# Patient Record
Sex: FEMALE | Race: WHITE
[De-identification: names, ages, dates, MRNs, and addresses within clinical notes are randomized per-mention and may not be internally consistent; named-entity substitution may affect disease eponyms.]

---

## 2019-11-04 PROBLEM — Z00.00 ENCOUNTER FOR PREVENTIVE HEALTH EXAMINATION: Status: ACTIVE | Noted: 2019-11-04

## 2019-11-05 ENCOUNTER — APPOINTMENT (OUTPATIENT)
Dept: GASTROENTEROLOGY | Facility: CLINIC | Age: 29
End: 2019-11-05
Payer: COMMERCIAL

## 2019-11-05 ENCOUNTER — LABORATORY RESULT (OUTPATIENT)
Age: 29
End: 2019-11-05

## 2019-11-05 VITALS
SYSTOLIC BLOOD PRESSURE: 107 MMHG | HEIGHT: 64 IN | BODY MASS INDEX: 20.49 KG/M2 | WEIGHT: 120 LBS | TEMPERATURE: 98.9 F | HEART RATE: 104 BPM | DIASTOLIC BLOOD PRESSURE: 80 MMHG | OXYGEN SATURATION: 98 % | RESPIRATION RATE: 15 BRPM

## 2019-11-05 DIAGNOSIS — K90.0 CELIAC DISEASE: ICD-10-CM

## 2019-11-05 DIAGNOSIS — E61.0 COPPER DEFICIENCY: ICD-10-CM

## 2019-11-05 DIAGNOSIS — R19.7 DIARRHEA, UNSPECIFIED: ICD-10-CM

## 2019-11-05 DIAGNOSIS — R17 UNSPECIFIED JAUNDICE: ICD-10-CM

## 2019-11-05 DIAGNOSIS — K90.49 MALABSORPTION DUE TO INTOLERANCE, NOT ELSEWHERE CLASSIFIED: ICD-10-CM

## 2019-11-05 DIAGNOSIS — R12 HEARTBURN: ICD-10-CM

## 2019-11-05 DIAGNOSIS — R11.2 NAUSEA WITH VOMITING, UNSPECIFIED: ICD-10-CM

## 2019-11-05 DIAGNOSIS — Z87.898 PERSONAL HISTORY OF OTHER SPECIFIED CONDITIONS: ICD-10-CM

## 2019-11-05 DIAGNOSIS — K59.00 CONSTIPATION, UNSPECIFIED: ICD-10-CM

## 2019-11-05 DIAGNOSIS — Z87.19 PERSONAL HISTORY OF OTHER DISEASES OF THE DIGESTIVE SYSTEM: ICD-10-CM

## 2019-11-05 DIAGNOSIS — K63.89 OTHER SPECIFIED DISEASES OF INTESTINE: ICD-10-CM

## 2019-11-05 PROCEDURE — 99204 OFFICE O/P NEW MOD 45 MIN: CPT | Mod: 25

## 2019-11-05 PROCEDURE — 36415 COLL VENOUS BLD VENIPUNCTURE: CPT

## 2019-11-06 PROBLEM — K90.49 FOOD INTOLERANCE: Status: ACTIVE | Noted: 2019-11-05

## 2019-11-06 PROBLEM — R19.7 DIARRHEA: Status: ACTIVE | Noted: 2019-11-05

## 2019-11-06 PROBLEM — Z87.898 HISTORY OF HEARTBURN: Status: RESOLVED | Noted: 2019-11-05 | Resolved: 2019-11-06

## 2019-11-06 PROBLEM — K59.00 CONSTIPATION: Status: ACTIVE | Noted: 2019-11-05

## 2019-11-06 PROBLEM — Z87.19 HISTORY OF GASTROESOPHAGEAL REFLUX (GERD): Status: RESOLVED | Noted: 2019-11-05 | Resolved: 2019-11-06

## 2019-11-06 PROBLEM — R11.2 NAUSEA AND VOMITING: Status: ACTIVE | Noted: 2019-11-05

## 2019-11-06 PROBLEM — K90.0 ADULT CELIAC DISEASE: Status: RESOLVED | Noted: 2019-11-05 | Resolved: 2019-11-06

## 2019-11-06 PROBLEM — R12 HEART BURN: Status: ACTIVE | Noted: 2019-11-05

## 2019-11-06 LAB — CERULOPLASMIN SERPL-MCNC: 16 MG/DL

## 2019-11-06 NOTE — PHYSICAL EXAM
[General Appearance - Alert] : alert [General Appearance - In No Acute Distress] : in no acute distress [General Appearance - Well Nourished] : well nourished [Sclera] : the sclera and conjunctiva were normal [Outer Ear] : the ears and nose were normal in appearance [Oropharynx] : the oropharynx was normal [Neck Appearance] : the appearance of the neck was normal [Neck Cervical Mass (___cm)] : no neck mass was observed [Respiration, Rhythm And Depth] : normal respiratory rhythm and effort [Heart Rate And Rhythm] : heart rate was normal and rhythm regular [Edema] : there was no peripheral edema [Bowel Sounds] : normal bowel sounds [Abdomen Soft] : soft [Abdomen Tenderness] : non-tender [Abnormal Walk] : normal gait [Skin Color & Pigmentation] : normal skin color and pigmentation [Skin Turgor] : normal skin turgor [] : no rash [Oriented To Time, Place, And Person] : oriented to person, place, and time [Impaired Insight] : insight and judgment were intact [Affect] : the affect was normal

## 2019-11-07 LAB
ANA SER IF-ACNC: NEGATIVE
BARLEY IGE QN: <0.1 KUA/L
CHERRY IGE QN: <0.1 KUA/L
COW MILK IGE QN: <0.1 KUA/L
CRAB IGE QN: <0.1 KUA/L
DEPRECATED BARLEY IGE RAST QL: 0
DEPRECATED CHERRY IGE RAST QL: 0
DEPRECATED COW MILK IGE RAST QL: 0
DEPRECATED CRAB IGE RAST QL: 0
DEPRECATED EGG WHITE IGE RAST QL: 0
DEPRECATED OAT IGE RAST QL: 0
DEPRECATED PEANUT IGE RAST QL: 3
DEPRECATED RYE IGE RAST QL: 0
DEPRECATED SOYBEAN IGE RAST QL: 0
DEPRECATED WHEAT IGE RAST QL: 0
EGG WHITE IGE QN: <0.1 KUA/L
OAT IGE QN: <0.1 KUA/L
PEANUT IGE QN: 3.94 KUA/L
RYE IGE QN: <0.1 KUA/L
SOYBEAN IGE QN: <0.1 KUA/L
TOTAL IGE SMQN RAST: 42 KU/L
WHEAT IGE QN: <0.1 KUA/L

## 2019-11-07 NOTE — ASSESSMENT
[FreeTextEntry1] : 29F with PMHx interstitial cystitis, celiac disease, POTS and endometriosis referred by Meeta Reyes for management of SIBO. \par - retest for SIBO since symptoms are reoccurring, will send home breath test to patient  \par - will evaluate low copper level by checking copper level and ceruloplasmin level today\par - TITI level to determine if any autoimmune disease are present \par - food allergy testing, vitamin and mineral levels, fecal calprotectin and CRP, GI PCR, Giardia stool sample, O/P \par - evaluate high bilirubin level with fractionated bilirubin as well as schedule patient for abdominal ultrasound \par - collaborate with Meeta Reyes for diet plan \par \par F/U with results and RTC after SIBO test done \par \par Kerry Benz, NP

## 2019-11-07 NOTE — HISTORY OF PRESENT ILLNESS
[de-identified] : 29F with PMHx interstitial cystitis, celiac disease, POTS and endometriosis referred by Meeta Reyes for management of SIBO. \par \par Patient reports that she was initially diagnosed with SIBO in December 2018. She took Rifaximin (felt better) then did Rifaximin again without SIBO test.\par A year and a half ago was when symptoms got severe but has always had stomach issues. \par Her main symptoms now are constipation and bloating. She has suffered with constipation for a long time - currently takes MiraLAX nightly for half the month each month. Baseline nausea. Gets the stomach flu every year. She has a family history of celiac disease. She reports that she had celiac genetic test which was + therefore she will never eat gluten again. Refused to do gluten load for proper diagnosis through endoscopy. \par \par 9/19 labs\par sucrose- borderline \par CMP- normal, ALT 29\par hemoglobin 13.1 \par albumin- 5\par bilirubin- 1.7 \par high cholesterol \par iron saturation- 54% , was eating a lot of red meat \par TSH- normal \par ferritin- 89 \par copper low since on IC diet \par \par early history:\par vaginal delivery \par  - 2 years \par prone to UTIs - been on antibiotics \par laparoscopic surgery for endometriosis \par ulcer in the past- took Nexium

## 2019-11-08 LAB — COPPER SERPL-MCNC: 78 UG/DL

## 2019-11-20 LAB — DEPRECATED O AND P PREP STL: NORMAL

## 2019-12-06 ENCOUNTER — CLINICAL ADVICE (OUTPATIENT)
Age: 29
End: 2019-12-06

## 2020-01-15 ENCOUNTER — APPOINTMENT (OUTPATIENT)
Dept: GASTROENTEROLOGY | Facility: CLINIC | Age: 30
End: 2020-01-15
Payer: COMMERCIAL

## 2020-01-15 ENCOUNTER — TRANSCRIPTION ENCOUNTER (OUTPATIENT)
Age: 30
End: 2020-01-15

## 2020-01-15 VITALS
DIASTOLIC BLOOD PRESSURE: 72 MMHG | HEIGHT: 64 IN | WEIGHT: 121 LBS | HEART RATE: 113 BPM | BODY MASS INDEX: 20.66 KG/M2 | TEMPERATURE: 98.6 F | OXYGEN SATURATION: 98 % | SYSTOLIC BLOOD PRESSURE: 110 MMHG | RESPIRATION RATE: 15 BRPM

## 2020-01-15 PROCEDURE — 99213 OFFICE O/P EST LOW 20 MIN: CPT

## 2020-01-15 NOTE — ASSESSMENT
[FreeTextEntry1] : Impression: 29F with PMHx interstitial cystitis, celiac disease, POTS and endometriosis referred by Meeta Reyes for management of SIBO, here for SmartPill procedure.\par \par SmartPill Administration:\par Patient presented to office having fasted for 8 hours.  I assessed for contraindications to SmartPill, none of which are present. We reviewed adverse events of SmartPill administration including capsule retention.  Patient ate the meal bar in the allotted 15 minutes and ingested the capsule without difficulty.  She was instructed to fast for an additional 6 hours during which she may sip one cup of water.  We discussed SmartPill recorder and diary use.  Patient verbalizes understanding. \par \par Disposition: Patient will return the SmartPill recorder and diary on Tuesday, January 21st, 2020.  She is instructed to contact the office if any questions arise.

## 2020-01-15 NOTE — HISTORY OF PRESENT ILLNESS
[de-identified] : 29F with PMHx interstitial cystitis, celiac disease, POTS and endometriosis referred by Meeta Reyes for management of SIBO, here for SmartPill procedure.\par \par 1/15/20\par - Patient has been suffering with symptoms for a long time. Her main symptoms remain to be constipation and bloating. She is gluten free therefore brought her own test meal to eat prior to the study. She has not taken laxatives or antacids in preparation for test. Does not drink ETOH or smoke. \par \par Previous history:\par Patient reports that she was initially diagnosed with SIBO in December 2018. She took Rifaximin (felt better) then did Rifaximin again without SIBO test.\par A year and a half ago was when symptoms got severe but has always had stomach issues. \par Her main symptoms now are constipation and bloating. She has suffered with constipation for a long time - currently takes MiraLAX nightly for half the month each month. Baseline nausea. Gets the stomach flu every year. She has a family history of celiac disease. She reports that she had celiac genetic test which was + therefore she will never eat gluten again. Refused to do gluten load for proper diagnosis through endoscopy. \par \par 9/19 labs\par sucrose- borderline \par CMP- normal, ALT 29\par hemoglobin 13.1 \par albumin- 5\par bilirubin- 1.7 \par high cholesterol \par iron saturation- 54% , was eating a lot of red meat \par TSH- normal \par ferritin- 89 \par copper low since on IC diet \par \par early history:\par vaginal delivery \par  - 2 years \par prone to UTIs - been on antibiotics \par laparoscopic surgery for endometriosis \par ulcer in the past- took Nexium

## 2020-01-21 ENCOUNTER — APPOINTMENT (OUTPATIENT)
Dept: GASTROENTEROLOGY | Facility: CLINIC | Age: 30
End: 2020-01-21
Payer: COMMERCIAL

## 2020-01-21 PROCEDURE — ZZZZZ: CPT

## 2020-01-21 PROCEDURE — 91112 GI WIRELESS CAPSULE MEASURE: CPT

## 2020-01-23 ENCOUNTER — RESULT REVIEW (OUTPATIENT)
Age: 30
End: 2020-01-23

## 2020-01-23 ENCOUNTER — TRANSCRIPTION ENCOUNTER (OUTPATIENT)
Age: 30
End: 2020-01-23

## 2020-01-24 ENCOUNTER — APPOINTMENT (OUTPATIENT)
Dept: GASTROENTEROLOGY | Facility: CLINIC | Age: 30
End: 2020-01-24

## 2020-01-24 ENCOUNTER — TRANSCRIPTION ENCOUNTER (OUTPATIENT)
Age: 30
End: 2020-01-24

## 2020-01-24 ENCOUNTER — OUTPATIENT (OUTPATIENT)
Dept: OUTPATIENT SERVICES | Facility: HOSPITAL | Age: 30
LOS: 1 days | End: 2020-01-24
Payer: COMMERCIAL

## 2020-01-24 PROCEDURE — 74019 RADEX ABDOMEN 2 VIEWS: CPT | Mod: 26

## 2020-01-24 PROCEDURE — 74019 RADEX ABDOMEN 2 VIEWS: CPT

## 2020-01-25 ENCOUNTER — TRANSCRIPTION ENCOUNTER (OUTPATIENT)
Age: 30
End: 2020-01-25

## 2020-01-25 LAB
25(OH)D3 SERPL-MCNC: 27.5 NG/ML
A-TOCOPHEROL VIT E SERPL-MCNC: 10.3 MG/L
BETA+GAMMA TOCOPHEROL SERPL-MCNC: 0.4 MG/L
BILIRUB DIRECT SERPL-MCNC: 0.2 MG/DL
BILIRUB INDIRECT SERPL-MCNC: 0.6 MG/DL
BILIRUB SERPL-MCNC: 0.8 MG/DL
CALPROTECTIN FECAL: 53 UG/G
CRP SERPL-MCNC: <0.1 MG/DL
FERRITIN SERPL-MCNC: 25 NG/ML
FOLATE SERPL-MCNC: 15.8 NG/ML
G LAMBLIA AG STL QL: NORMAL
GI PCR PANEL, STOOL: ABNORMAL
IF BLOCK AB SER QL: NORMAL
INR PPP: 1.07 RATIO
IRON SATN MFR SERPL: 22 %
IRON SERPL-MCNC: 68 UG/DL
MAGNESIUM SERPL-MCNC: 2 MG/DL
PCA AB SER QL IF: NORMAL
PHOSPHATE SERPL-MCNC: 3.7 MG/DL
PT BLD: 12.1 SEC
TIBC SERPL-MCNC: 306 UG/DL
UIBC SERPL-MCNC: 238 UG/DL
VIT A SERPL-MCNC: 42.3 UG/DL
VIT B12 SERPL-MCNC: 684 PG/ML
ZINC SERPL-MCNC: 68 UG/DL

## 2020-01-27 ENCOUNTER — TRANSCRIPTION ENCOUNTER (OUTPATIENT)
Age: 30
End: 2020-01-27

## 2020-01-28 ENCOUNTER — TRANSCRIPTION ENCOUNTER (OUTPATIENT)
Age: 30
End: 2020-01-28

## 2020-01-29 ENCOUNTER — APPOINTMENT (OUTPATIENT)
Dept: GASTROENTEROLOGY | Facility: CLINIC | Age: 30
End: 2020-01-29
Payer: COMMERCIAL

## 2020-01-29 VITALS
RESPIRATION RATE: 14 BRPM | WEIGHT: 123 LBS | HEART RATE: 107 BPM | SYSTOLIC BLOOD PRESSURE: 96 MMHG | OXYGEN SATURATION: 98 % | TEMPERATURE: 97.6 F | BODY MASS INDEX: 21 KG/M2 | DIASTOLIC BLOOD PRESSURE: 60 MMHG | HEIGHT: 64 IN

## 2020-01-29 PROCEDURE — 99213 OFFICE O/P EST LOW 20 MIN: CPT

## 2020-01-29 NOTE — HISTORY OF PRESENT ILLNESS
[de-identified] : 29F with PMHx interstitial cystitis, celiac disease, POTS and endometriosis referred by Meeta Reyes for management of SIBO, here for SmartPill procedure.\par \par 1/29/20\par - Patient here today for repeat SmartPill procedure as last study stopped recording data after 9.5 hours. Patient x-ray done which did not show evidence of retained pill. Patient states that today her stomach is hurting on the left lower quadrant from stopping Nexium for 7 days. She also has continued to be off laxatives. She is worried she will not have a BM for the next week due to how constipated she gets. \par \par 1/15/20\par - Patient has been suffering with symptoms for a long time. Her main symptoms remain to be constipation and bloating. She is gluten free therefore brought her own test meal to eat prior to the study. She has not taken laxatives or antacids in preparation for test. Does not drink ETOH or smoke. \par \par Previous history:\par Patient reports that she was initially diagnosed with SIBO in December 2018. She took Rifaximin (felt better) then did Rifaximin again without SIBO test.\par A year and a half ago was when symptoms got severe but has always had stomach issues. \par Her main symptoms now are constipation and bloating. She has suffered with constipation for a long time - currently takes MiraLAX nightly for half the month each month. Baseline nausea. Gets the stomach flu every year. She has a family history of celiac disease. She reports that she had celiac genetic test which was + therefore she will never eat gluten again. Refused to do gluten load for proper diagnosis through endoscopy. \par \par 9/19 labs\par sucrose- borderline \par CMP- normal, ALT 29\par hemoglobin 13.1 \par albumin- 5\par bilirubin- 1.7 \par high cholesterol \par iron saturation- 54% , was eating a lot of red meat \par TSH- normal \par ferritin- 89 \par copper low since on IC diet \par \par early history:\par vaginal delivery \par  - 2 years \par prone to UTIs - been on antibiotics \par laparoscopic surgery for endometriosis \par ulcer in the past- took Nexium

## 2020-01-29 NOTE — PHYSICAL EXAM
[General Appearance - Alert] : alert [General Appearance - In No Acute Distress] : in no acute distress [General Appearance - Well Nourished] : well nourished [] : no respiratory distress [Respiration, Rhythm And Depth] : normal respiratory rhythm and effort [Heart Rate And Rhythm] : heart rate was normal and rhythm regular [Bowel Sounds] : normal bowel sounds [Abdomen Soft] : soft [Abdomen Tenderness] : non-tender [Impaired Insight] : insight and judgment were intact [Oriented To Time, Place, And Person] : oriented to person, place, and time [Affect] : the affect was normal

## 2020-01-29 NOTE — ASSESSMENT
[FreeTextEntry1] : Impression: 29F with PMHx interstitial cystitis, celiac disease, POTS and endometriosis referred by Meeta Reyes for management of SIBO, here for SmartPill procedure.\par \par SmartPill Administration:\par Patient presented to office having fasted for 8 hours.  I assessed for contraindications to SmartPill, none of which are present. We reviewed adverse events of SmartPill administration including capsule retention.  Patient ate the meal bar in the allotted 15 minutes and ingested the capsule without difficulty.  She was instructed to fast for an additional 6 hours during which she may sip one cup of water.  We discussed SmartPill recorder and diary use.  Patient verbalizes understanding. \par \par Disposition: Patient will return the SmartPill recorder and diary on Tuesday, February 4th, 2020.  She is instructed to contact the office if any questions arise. 
0

## 2020-01-30 ENCOUNTER — TRANSCRIPTION ENCOUNTER (OUTPATIENT)
Age: 30
End: 2020-01-30

## 2020-02-04 ENCOUNTER — APPOINTMENT (OUTPATIENT)
Dept: GASTROENTEROLOGY | Facility: CLINIC | Age: 30
End: 2020-02-04

## 2020-02-04 ENCOUNTER — TRANSCRIPTION ENCOUNTER (OUTPATIENT)
Age: 30
End: 2020-02-04

## 2020-02-07 ENCOUNTER — APPOINTMENT (OUTPATIENT)
Dept: GASTROENTEROLOGY | Facility: CLINIC | Age: 30
End: 2020-02-07
Payer: COMMERCIAL

## 2020-02-07 VITALS
WEIGHT: 123 LBS | DIASTOLIC BLOOD PRESSURE: 75 MMHG | HEIGHT: 64 IN | HEART RATE: 88 BPM | BODY MASS INDEX: 21 KG/M2 | RESPIRATION RATE: 14 BRPM | SYSTOLIC BLOOD PRESSURE: 112 MMHG | OXYGEN SATURATION: 99 % | TEMPERATURE: 98.5 F

## 2020-02-07 DIAGNOSIS — K58.0 IRRITABLE BOWEL SYNDROME WITH DIARRHEA: ICD-10-CM

## 2020-02-07 PROCEDURE — 99215 OFFICE O/P EST HI 40 MIN: CPT

## 2020-02-07 RX ORDER — ESOMEPRAZOLE MAGNESIUM 40 MG/1
40 CAPSULE, DELAYED RELEASE ORAL DAILY
Qty: 30 | Refills: 2 | Status: COMPLETED | COMMUNITY
Start: 2020-02-07 | End: 2020-02-07

## 2020-02-07 NOTE — PHYSICAL EXAM
[General Appearance - Alert] : alert [General Appearance - In No Acute Distress] : in no acute distress [Sclera] : the sclera and conjunctiva were normal [General Appearance - Well Nourished] : well nourished [Outer Ear] : the ears and nose were normal in appearance [Oropharynx] : the oropharynx was normal [Neck Cervical Mass (___cm)] : no neck mass was observed [Neck Appearance] : the appearance of the neck was normal [Respiration, Rhythm And Depth] : normal respiratory rhythm and effort [Heart Rate And Rhythm] : heart rate was normal and rhythm regular [Edema] : there was no peripheral edema [Bowel Sounds] : normal bowel sounds [Abdomen Soft] : soft [Abdomen Tenderness] : non-tender [Abnormal Walk] : normal gait [Skin Color & Pigmentation] : normal skin color and pigmentation [Skin Turgor] : normal skin turgor [Oriented To Time, Place, And Person] : oriented to person, place, and time [Impaired Insight] : insight and judgment were intact [] : no rash [Affect] : the affect was normal

## 2020-02-10 NOTE — HISTORY OF PRESENT ILLNESS
[de-identified] : 29F with PMHx interstitial cystitis, celiac disease, POTS and endometriosis referred by Meeta Reyes for management of SIBO. \par \par 2/7/20\par - Smartpill malfunction\par - MiraLAX helps to get rid of constipation but causes imbalance of electrolytes. no more gluten free bread. had BM Monday and Friday - since December- soft stools almost diarrhea \par - Paleo diet now \par - Nexium and Pepcid, wants Zantac back because Pepcid gives her diarrhea \par - heartburn was bad when off for SmartPill \par - cystitis causes a lot of issues - urobel \par \par Previous history:\par Patient reports that she was initially diagnosed with SIBO in December 2018. She took Rifaximin (felt better) then did Rifaximin again without SIBO test.\par A year and a half ago was when symptoms got severe but has always had stomach issues. \par Her main symptoms now are constipation and bloating. She has suffered with constipation for a long time - currently takes MiraLAX nightly for half the month each month. Baseline nausea. Gets the stomach flu every year. She has a family history of celiac disease. She reports that she had celiac genetic test which was + therefore she will never eat gluten again. Refused to do gluten load for proper diagnosis through endoscopy. \par \par 9/19 labs\par sucrose- borderline \par CMP- normal, ALT 29\par hemoglobin 13.1 \par albumin- 5\par bilirubin- 1.7 \par high cholesterol \par iron saturation- 54% , was eating a lot of red meat \par TSH- normal \par ferritin- 89 \par copper low since on IC diet \par \par early history:\par vaginal delivery \par  - 2 years \par prone to UTIs - been on antibiotics \par laparoscopic surgery for endometriosis \par ulcer in the past- took Nexium

## 2020-02-10 NOTE — ASSESSMENT
[FreeTextEntry1] : 29F with PMHx interstitial cystitis, celiac disease, POTS and endometriosis referred by Meeta Reyes for management of SIBO. \par - SIBO + for HYDROGEN AND METHANE OVERGROWTH, will give xifaxan/neomycin combination- all risks sent to patient and reviewed and pt confirms understanding of risks.\par - smart pill shows gastroparesis with emtpying time of 9hours- will need egd\par - start motegrity low dose as prokinetic for constipation\par - fecal calprotectin >50 will need cscope to r/o inflammatory disease\par - avoid peanuts as food allergy testing positive\par - optimize vitamin D levels \par - evaluate high bilirubin level with fractionated bilirubin as well as schedule patient for abdominal ultrasound \par - collaborate with Meeta Reyes for diet plan \par \par Follow up after patient returns from vacation in 2 weeks

## 2020-02-11 ENCOUNTER — TRANSCRIPTION ENCOUNTER (OUTPATIENT)
Age: 30
End: 2020-02-11

## 2020-02-20 ENCOUNTER — TRANSCRIPTION ENCOUNTER (OUTPATIENT)
Age: 30
End: 2020-02-20

## 2020-03-06 ENCOUNTER — APPOINTMENT (OUTPATIENT)
Dept: GASTROENTEROLOGY | Facility: CLINIC | Age: 30
End: 2020-03-06
Payer: COMMERCIAL

## 2020-03-06 VITALS
DIASTOLIC BLOOD PRESSURE: 70 MMHG | WEIGHT: 125 LBS | SYSTOLIC BLOOD PRESSURE: 120 MMHG | HEART RATE: 105 BPM | OXYGEN SATURATION: 98 % | HEIGHT: 64 IN | BODY MASS INDEX: 21.34 KG/M2 | RESPIRATION RATE: 16 BRPM

## 2020-03-06 PROCEDURE — 99215 OFFICE O/P EST HI 40 MIN: CPT

## 2020-03-06 RX ORDER — ESOMEPRAZOLE MAGNESIUM 40 MG/1
40 CAPSULE, DELAYED RELEASE ORAL DAILY
Qty: 30 | Refills: 2 | Status: DISCONTINUED | COMMUNITY
Start: 2020-02-07 | End: 2020-03-06

## 2020-03-06 RX ORDER — NEOMYCIN SULFATE 500 MG/1
500 TABLET ORAL TWICE DAILY
Qty: 28 | Refills: 0 | Status: COMPLETED | COMMUNITY
Start: 2020-02-07 | End: 2020-03-06

## 2020-03-09 ENCOUNTER — TRANSCRIPTION ENCOUNTER (OUTPATIENT)
Age: 30
End: 2020-03-09

## 2020-03-09 NOTE — PHYSICAL EXAM
[General Appearance - Alert] : alert [General Appearance - In No Acute Distress] : in no acute distress [General Appearance - Well Nourished] : well nourished [Sclera] : the sclera and conjunctiva were normal [Outer Ear] : the ears and nose were normal in appearance [Oropharynx] : the oropharynx was normal [Neck Appearance] : the appearance of the neck was normal [Neck Cervical Mass (___cm)] : no neck mass was observed [Respiration, Rhythm And Depth] : normal respiratory rhythm and effort [Heart Rate And Rhythm] : heart rate was normal and rhythm regular [Edema] : there was no peripheral edema [Bowel Sounds] : normal bowel sounds [Abdomen Soft] : soft [Abnormal Walk] : normal gait [Skin Color & Pigmentation] : normal skin color and pigmentation [Skin Turgor] : normal skin turgor [] : no rash [Oriented To Time, Place, And Person] : oriented to person, place, and time [Impaired Insight] : insight and judgment were intact [Affect] : the affect was normal [FreeTextEntry1] : tenderness felt throughout especially in LUQ, slight distention noted

## 2020-03-09 NOTE — ASSESSMENT
[FreeTextEntry1] : 29F with PMHx interstitial cystitis, celiac disease, POTS and endometriosis referred by Meeta Reyes for management of SIBO. \par - SIBO + for HYDROGEN AND METHANE OVERGROWTH, did not finish Flagyl part of course however symptoms seem to be more related to gastroparesis now therefore will try to optimize BMs \par - start Motegrity at 1 mg and take at night, can increase in 1 week to 2mg if symptoms do not improve \par - smart pill shows gastroparesis with emptying time of 9hours- will need egd\par - fecal calprotectin >50 will need cscope to r/o inflammatory disease\par - continue to avoid peanuts as food allergy testing positive\par - optimize vitamin D levels \par - collaborate with Meeta Reyes for diet plan \par \par F/U 6 weeks \par \par Kerry Benz, ERICH

## 2020-03-09 NOTE — HISTORY OF PRESENT ILLNESS
[de-identified] : 29F with PMHx interstitial cystitis, celiac disease, POTS and endometriosis referred by Meeta Reyes, here for follow up after SIBO treatment. \par \par 3/5/20\par - Took Rifaximin and Neomycin and felt horrible therefore switched to Flagyl (pooped every day on it) but bloating got severe. Finished Rifaximin but only took 5 days of Flagyl. Bloating is worse than prior to antibiotics. \par -  can't even button her pants, that is how severe the bloating is \par - 2 days of diarrhea after Rifaximin (water coming out)\par - now BMs better - taking Colace before bed \par - still sometimes having discomfort in left upper or left lower parts of stomach \par - can't eat apples now for some reason, feels them stuck in her stomach \par - eating a lot less than usual, diet consisting of eggs for breakfast, butternut squash soup, chicken and green beans \par - does mostly LOW FODMAP diet \par - biking once a week and strength training \par - did not start Motegrity yet - will start tonight \par \par 2/7/20\par - Smartpill malfunction\par - MiraLAX helps to get rid of constipation but causes imbalance of electrolytes. no more gluten free bread. had BM Monday and Friday - since December- soft stools almost diarrhea \par - Paleo diet now \par - Nexium and Pepcid, wants Zantac back because Pepcid gives her diarrhea \par - heartburn was bad when off for SmartPill \par - cystitis causes a lot of issues - urobel \par \par Previous history:\par Patient reports that she was initially diagnosed with SIBO in December 2018. She took Rifaximin (felt better) then did Rifaximin again without SIBO test.\par A year and a half ago was when symptoms got severe but has always had stomach issues. \par Her main symptoms now are constipation and bloating. She has suffered with constipation for a long time - currently takes MiraLAX nightly for half the month each month. Baseline nausea. Gets the stomach flu every year. She has a family history of celiac disease. She reports that she had celiac genetic test which was + therefore she will never eat gluten again. Refused to do gluten load for proper diagnosis through endoscopy. \par \par 9/19 labs\par sucrose- borderline \par CMP- normal, ALT 29\par hemoglobin 13.1 \par albumin- 5\par bilirubin- 1.7 \par high cholesterol \par iron saturation- 54% , was eating a lot of red meat \par TSH- normal \par ferritin- 89 \par copper low since on IC diet \par \par early history:\par vaginal delivery \par  - 2 years \par prone to UTIs - been on antibiotics \par laparoscopic surgery for endometriosis \par ulcer in the past- took Nexium

## 2020-04-15 ENCOUNTER — TRANSCRIPTION ENCOUNTER (OUTPATIENT)
Age: 30
End: 2020-04-15

## 2020-06-10 ENCOUNTER — APPOINTMENT (OUTPATIENT)
Dept: GASTROENTEROLOGY | Facility: CLINIC | Age: 30
End: 2020-06-10
Payer: COMMERCIAL

## 2020-06-10 DIAGNOSIS — K31.84 GASTROPARESIS: ICD-10-CM

## 2020-06-10 PROCEDURE — 99215 OFFICE O/P EST HI 40 MIN: CPT | Mod: 95

## 2020-06-10 NOTE — ASSESSMENT
[FreeTextEntry1] : 29F with PMHx interstitial cystitis, celiac disease, POTS and endometriosis referred by Meeta Reyes for management of SIBO. \par \par - SIBO + for HYDROGEN AND METHANE OVERGROWTH, did not finish Flagyl part of course however symptoms seem to be more related to gastroparesis\par - will repeat SIBO test again \par -gastroparesis diagnosed on smartpill, but smartpill malfunctioned, so will repeat to assess small bowel and colonic transit time\par - will increase dose of Motegrity to 2 mg mg and take at night. Recommend 6 week trial. If symptoms of gastroparesis are not improved, can consider domperidone. Avoid reglan given adverse side effects\par -EGD to rule out anatomic etiology that may mimic gastroparesis \par -TSH, A1C to assess for underlying etiologies of gastroparesis\par - will repeat fecal calprotectin, if  >50 will need cscope to r/o inflammatory disease\par - continue to avoid peanuts as food allergy testing positive\par - optimize vitamin D levels \par - collaborate with Meeta Reyes for diet plan - discussed small frequent low fodmap meals\par \par Aylin Marvin MD\par GI fellow \par \par time spent 40min\par

## 2020-06-10 NOTE — HISTORY OF PRESENT ILLNESS
[Home] : at home, [unfilled] , at the time of the visit. [Other Location: e.g. Home (Enter Location, City,State)___] : at [unfilled] [Verbal consent obtained from patient] : the patient, [unfilled] [de-identified] : 29F with PMHx interstitial cystitis, celiac disease, POTS and endometriosis referred by Meeta Reyes, here for follow up after SIBO treatment. \par \par telemedicine requested for persistent/worsening symptoms\par \par SIBO test positive for hydrogen and methane in Jan 2020. She only completed 4 days of neomycin and one week of flagyl- stopped abx due to adverse symptoms.\par \par In part of SIBO workup, patient underwent smartpill confirming gastroparesis but it malfunctioned and the small bowel and colon transit times were not assessed. In March she was started on motegrity. She has gradually increased to 1.5 mg daily. She feels like symptoms are slightly improved but not completely resolved. She still has bloating and some nausea. She is on low fodmap, and  liquid diet during day and eats chicken breast at night. She generally eats 3 meals a day. Her BMs improved while on motegrity but for the past 2 weeks, her constipation has worsened. She recently had mucous cysts on her lip removed by oral surgeon, who placed her on amoxicillin and she feels like that worsened her constipation. She is also less active and has not been exercising\par \par 3/5/20\par - Took Rifaximin and Neomycin and felt horrible therefore switched to Flagyl (pooped every day on it) but bloating got severe. Finished Rifaximin but only took 5 days of Flagyl. Bloating is worse than prior to antibiotics. \par -  can't even button her pants, that is how severe the bloating is \par - 2 days of diarrhea after Rifaximin (water coming out)\par - now BMs better - taking Colace before bed \par - still sometimes having discomfort in left upper or left lower parts of stomach \par - can't eat apples now for some reason, feels them stuck in her stomach \par - eating a lot less than usual, diet consisting of eggs for breakfast, butternut squash soup, chicken and green beans \par - does mostly LOW FODMAP diet \par - biking once a week and strength training \par - did not start Motegrity yet - will start tonight \par \par 2/7/20\par - Smartpill malfunction\par - MiraLAX helps to get rid of constipation but causes imbalance of electrolytes. no more gluten free bread. had BM Monday and Friday - since December- soft stools almost diarrhea \par - Paleo diet now \par - Nexium and Pepcid, wants Zantac back because Pepcid gives her diarrhea \par - heartburn was bad when off for SmartPill \par - cystitis causes a lot of issues - urobel \par \par Previous history:\par Patient reports that she was initially diagnosed with SIBO in December 2018. She took Rifaximin (felt better) then did Rifaximin again without SIBO test.\par A year and a half ago was when symptoms got severe but has always had stomach issues. \par Her main symptoms now are constipation and bloating. She has suffered with constipation for a long time - currently takes MiraLAX nightly for half the month each month. Baseline nausea. Gets the stomach flu every year. She has a family history of celiac disease. She reports that she had celiac genetic test which was + therefore she will never eat gluten again. Refused to do gluten load for proper diagnosis through endoscopy. \par \par 9/19 labs\par sucrose- borderline \par CMP- normal, ALT 29\par hemoglobin 13.1 \par albumin- 5\par bilirubin- 1.7 \par high cholesterol \par iron saturation- 54% , was eating a lot of red meat \par TSH- normal \par ferritin- 89 \par copper low since on IC diet \par \par early history:\par vaginal delivery \par  - 2 years \par prone to UTIs - been on antibiotics \par laparoscopic surgery for endometriosis \par ulcer in the past- took Nexium

## 2020-09-01 ENCOUNTER — TRANSCRIPTION ENCOUNTER (OUTPATIENT)
Age: 30
End: 2020-09-01

## 2020-09-02 ENCOUNTER — TRANSCRIPTION ENCOUNTER (OUTPATIENT)
Age: 30
End: 2020-09-02

## 2020-09-11 ENCOUNTER — TRANSCRIPTION ENCOUNTER (OUTPATIENT)
Age: 30
End: 2020-09-11

## 2020-09-14 ENCOUNTER — TRANSCRIPTION ENCOUNTER (OUTPATIENT)
Age: 30
End: 2020-09-14

## 2020-09-22 ENCOUNTER — TRANSCRIPTION ENCOUNTER (OUTPATIENT)
Age: 30
End: 2020-09-22

## 2020-09-25 ENCOUNTER — TRANSCRIPTION ENCOUNTER (OUTPATIENT)
Age: 30
End: 2020-09-25

## 2020-09-28 ENCOUNTER — TRANSCRIPTION ENCOUNTER (OUTPATIENT)
Age: 30
End: 2020-09-28

## 2020-10-23 ENCOUNTER — APPOINTMENT (OUTPATIENT)
Dept: GASTROENTEROLOGY | Facility: CLINIC | Age: 30
End: 2020-10-23
Payer: COMMERCIAL

## 2020-10-23 PROCEDURE — 99215 OFFICE O/P EST HI 40 MIN: CPT | Mod: 95

## 2020-10-23 NOTE — PHYSICAL EXAM
[General Appearance - Alert] : alert [General Appearance - In No Acute Distress] : in no acute distress [Sclera] : the sclera and conjunctiva were normal [Outer Ear] : the ears and nose were normal in appearance [Neck Appearance] : the appearance of the neck was normal [Skin Color & Pigmentation] : normal skin color and pigmentation [Oriented To Time, Place, And Person] : oriented to person, place, and time

## 2020-10-26 NOTE — HISTORY OF PRESENT ILLNESS
[Home] : at home, [unfilled] , at the time of the visit. [Medical Office: ([facility])___] : at the medical office located in  [Verbal consent obtained from patient] : the patient, [unfilled] [de-identified] : 29F with PMHx interstitial cystitis, celiac disease, POTS and endometriosis , here for a follow up visit.\par Patient states that since July she started experiencing constipation and bloating was unable to go to bathroom even with simultaneous use of MiraLAX, Colace , eventually added bisacodyl that helped her go. Had Called our office  and plan was to start rifaximin and falgyl for 2 weeks. Patient states she got the medication but was already starting to feel better, took dose for a day felt very nauseous so stopped taking the meds, and kept felling better and now feels very good. Having bm soft 1-2 times a day (occasionally small calibre) . No blood in stool. Still has bloating all day with some abdominal discomfort.\par No n/v/fever/ blood in stool\par Has heartburn mohan when takes aspirin or NSAID and takes famotidine at that time as needed. \par No  recent change in weight/appetitie \par \par \par \par \par \par Past history \par SIBO test positive for hydrogen and methane in Jan 2020. She only completed 4 days of neomycin and one week of flagyl- stopped abx due to adverse symptoms.\par \par In part of SIBO workup, patient underwent smartpill confirming gastroparesis but it malfunctioned and the small bowel and colon transit times were not assessed. In March she was started on motegrity. She has gradually increased to 1.5 mg daily. She feels like symptoms are slightly improved but not completely resolved. She still has bloating and some nausea. She is on low fodmap, and liquid diet during day and eats chicken breast at night. She generally eats 3 meals a day. Her BMs improved while on motegrity but for the past 2 weeks, her constipation has worsened. She recently had mucous cysts on her lip removed by oral surgeon, who placed her on amoxicillin and she feels like that worsened her constipation. She is also less active and has not been exercising\par \par 3/5/20\par - Took Rifaximin and Neomycin and felt horrible therefore switched to Flagyl (pooped every day on it) but bloating got severe. Finished Rifaximin but only took 5 days of Flagyl. Bloating is worse than prior to antibiotics. \par - can't even button her pants, that is how severe the bloating is \par - 2 days of diarrhea after Rifaximin (water coming out)\par - now BMs better - taking Colace before bed \par - still sometimes having discomfort in left upper or left lower parts of stomach \par - can't eat apples now for some reason, feels them stuck in her stomach \par - eating a lot less than usual, diet consisting of eggs for breakfast, butternut squash soup, chicken and green beans \par - does mostly LOW FODMAP diet \par - biking once a week and strength training \par - did not start Motegrity yet - will start tonight \par \par 2/7/20\par - Smartpill malfunction\par - MiraLAX helps to get rid of constipation but causes imbalance of electrolytes. no more gluten free bread. had BM Monday and Friday - since December- soft stools almost diarrhea \par - Paleo diet now \par - Nexium and Pepcid, wants Zantac back because Pepcid gives her diarrhea \par - heartburn was bad when off for SmartPill \par - cystitis causes a lot of issues - urobel \par \par Previous history:\par Patient reports that she was initially diagnosed with SIBO in December 2018. She took Rifaximin (felt better) then did Rifaximin again without SIBO test.\par A year and a half ago was when symptoms got severe but has always had stomach issues. \par Her main symptoms now are constipation and bloating. She has suffered with constipation for a long time - currently takes MiraLAX nightly for half the month each month. Baseline nausea. Gets the stomach flu every year. She has a family history of celiac disease. She reports that she had celiac genetic test which was + therefore she will never eat gluten again. Refused to do gluten load for proper diagnosis through endoscopy. \par \par 9/19 labs\par sucrose- borderline \par CMP- normal, ALT 29\par hemoglobin 13.1 \par albumin- 5\par bilirubin- 1.7 \par high cholesterol \par iron saturation- 54% , was eating a lot of red meat \par TSH- normal \par ferritin- 89 \par copper low since on IC diet \par \par early history:\par vaginal delivery \par  - 2 years \par prone to UTIs - been on antibiotics \par laparoscopic surgery for endometriosis \par ulcer in the past- took Nexium. \par

## 2020-10-26 NOTE — ASSESSMENT
[FreeTextEntry1] : 29F with PMHx interstitial cystitis, celiac disease, POTS and endometriosis referred by Meeta Reyes for management of SIBO. \par \par - SIBO (+ for HYDROGEN AND METHANE OVERGROWTH ion the past)\par - repeat SIBO was ordered previously, pending\par -fecal calpro pending\par -c/w Motegrity  2 mg mg and take at night. \par -will need EGD to rule out anatomic etiology that may mimic gastroparesis , will do biopsies for celiac/random biopsies, will do biopsies for disaccharidase defi, will do SIBO/SIFO aspirates\par -TSH, A1C to assess for underlying etiologies of gastroparesis\par - continue follow up with Meeta Reyes\par \par Gastroparesis: based on smartpill\par -never had EGD, consider EGD with biopsies\par -rest labwork as above\par \par follow up after above workup \par

## 2020-12-31 PROBLEM — K63.89 SMALL INTESTINAL BACTERIAL OVERGROWTH: Status: ACTIVE | Noted: 2019-11-05

## 2021-01-28 ENCOUNTER — TRANSCRIPTION ENCOUNTER (OUTPATIENT)
Age: 31
End: 2021-01-28

## 2021-02-01 ENCOUNTER — TRANSCRIPTION ENCOUNTER (OUTPATIENT)
Age: 31
End: 2021-02-01

## 2021-02-22 ENCOUNTER — TRANSCRIPTION ENCOUNTER (OUTPATIENT)
Age: 31
End: 2021-02-22

## 2021-03-04 ENCOUNTER — RX RENEWAL (OUTPATIENT)
Age: 31
End: 2021-03-04

## 2021-03-04 ENCOUNTER — TRANSCRIPTION ENCOUNTER (OUTPATIENT)
Age: 31
End: 2021-03-04

## 2021-04-15 ENCOUNTER — NON-APPOINTMENT (OUTPATIENT)
Age: 31
End: 2021-04-15

## 2021-04-15 ENCOUNTER — APPOINTMENT (OUTPATIENT)
Dept: GASTROENTEROLOGY | Facility: CLINIC | Age: 31
End: 2021-04-15
Payer: COMMERCIAL

## 2021-04-15 VITALS
RESPIRATION RATE: 16 BRPM | BODY MASS INDEX: 19.12 KG/M2 | DIASTOLIC BLOOD PRESSURE: 60 MMHG | HEIGHT: 64 IN | SYSTOLIC BLOOD PRESSURE: 100 MMHG | HEART RATE: 113 BPM | TEMPERATURE: 97.2 F | OXYGEN SATURATION: 97 % | WEIGHT: 112 LBS

## 2021-04-15 PROCEDURE — 99072 ADDL SUPL MATRL&STAF TM PHE: CPT

## 2021-04-15 PROCEDURE — 99215 OFFICE O/P EST HI 40 MIN: CPT

## 2021-04-19 NOTE — ASSESSMENT
[FreeTextEntry1] : 30 F with PMHx interstitial cystitis, celiac disease, POTS and endometriosis referred by Meeta Reyes for management of SIBO. \par \par SIBO (+ for HYDROGEN AND METHANE ) in the setting of constipation \par -s/p treatment with rifaximin, did not take flagyl\par -TSH and other labs have been wnl in the past \par -c/w Motegrity 2 mg mg and advised to take daily\par -discussed can use colace , can cut back to 2 pills a day \par -pt wishes to pursue EGD/cscope with biopsies (SIBO/SIFO /disaccharidase testing) as discussed during prior visit, r/b/a/i explained \par - continue follow up with Meeta Reyes\par \par Gastroparesis: based on smartpill\par -never had EGD, consider EGD with biopsies as above \par \par \par f/u after procedures

## 2021-04-19 NOTE — HISTORY OF PRESENT ILLNESS
[de-identified] : 30 F with PMHx interstitial cystitis, celiac disease, POTS and endometriosis , here for a follow up visit.\par \par Reports she still feels very constipated, has been taking combination of motegrity, colace and bisacodyl, takes motegrity 4-5 times a week, but feels very backed up, bloated and intermittent pain abdomen. BM very 1-2 day, hard, no urge to go . Denies n/v/fever. No blood in stool.\par Feels her SIBO is back , also feels very restircted in terms of her diet as can only tolerate very few food items now\par \par Previous history: \par Patient states that since July she started experiencing constipation and bloating was unable to go to bathroom even with simultaneous use of MiraLAX, Colace , eventually added bisacodyl that helped her go. Had Called our office and plan was to start rifaximin and falgyl for 2 weeks. Patient states she got the medication but was already starting to feel better, took dose for a day felt very nauseous so stopped taking the meds, and kept felling better and now feels very good. Having bm soft 1-2 times a day (occasionally small calibre). No blood in stool. Still has bloating all day with some abdominal discomfort.\par No n/v/fever/ blood in stool\par Has heartburn mohan when takes aspirin or NSAID and takes famotidine at that time as needed. \par No recent change in weight/appetitie \par \par \par \par \par \par Past history \par SIBO test positive for hydrogen and methane in Jan 2020. She only completed 4 days of neomycin and one week of flagyl- stopped abx due to adverse symptoms.\par \par In part of SIBO workup, patient underwent smartpill confirming gastroparesis but it malfunctioned and the small bowel and colon transit times were not assessed. In March she was started on motegrity. She has gradually increased to 1.5 mg daily. She feels like symptoms are slightly improved but not completely resolved. She still has bloating and some nausea. She is on low fodmap, and liquid diet during day and eats chicken breast at night. She generally eats 3 meals a day. Her BMs improved while on motegrity but for the past 2 weeks, her constipation has worsened. She recently had mucous cysts on her lip removed by oral surgeon, who placed her on amoxicillin and she feels like that worsened her constipation. She is also less active and has not been exercising\par \par 3/5/20\par - Took Rifaximin and Neomycin and felt horrible therefore switched to Flagyl (pooped every day on it) but bloating got severe. Finished Rifaximin but only took 5 days of Flagyl. Bloating is worse than prior to antibiotics. \par - can't even button her pants, that is how severe the bloating is \par - 2 days of diarrhea after Rifaximin (water coming out)\par - now BMs better - taking Colace before bed \par - still sometimes having discomfort in left upper or left lower parts of stomach \par - can't eat apples now for some reason, feels them stuck in her stomach \par - eating a lot less than usual, diet consisting of eggs for breakfast, butternut squash soup, chicken and green beans \par - does mostly LOW FODMAP diet \par - biking once a week and strength training \par - did not start Motegrity yet - will start tonight \par \par 2/7/20\par - Smartpill malfunction\par - MiraLAX helps to get rid of constipation but causes imbalance of electrolytes. no more gluten free bread. had BM Monday and Friday - since December- soft stools almost diarrhea \par - Paleo diet now \par - Nexium and Pepcid, wants Zantac back because Pepcid gives her diarrhea \par - heartburn was bad when off for SmartPill \par - cystitis causes a lot of issues - urobel \par \par Previous history:\par Patient reports that she was initially diagnosed with SIBO in December 2018. She took Rifaximin (felt better) then did Rifaximin again without SIBO test.\par A year and a half ago was when symptoms got severe but has always had stomach issues. \par Her main symptoms now are constipation and bloating. She has suffered with constipation for a long time - currently takes MiraLAX nightly for half the month each month. Baseline nausea. Gets the stomach flu every year. She has a family history of celiac disease. She reports that she had celiac genetic test which was + therefore she will never eat gluten again. Refused to do gluten load for proper diagnosis through endoscopy. \par \par 9/19 labs\par sucrose- borderline \par CMP- normal, ALT 29\par hemoglobin 13.1 \par albumin- 5\par bilirubin- 1.7 \par high cholesterol \par iron saturation- 54% , was eating a lot of red meat \par TSH- normal \par ferritin- 89 \par copper low since on IC diet \par \par early history:\par vaginal delivery \par  - 2 years \par prone to UTIs - been on antibiotics \par laparoscopic surgery for endometriosis \par ulcer in the past- took Nexium. \par \par

## 2021-05-10 ENCOUNTER — TRANSCRIPTION ENCOUNTER (OUTPATIENT)
Age: 31
End: 2021-05-10

## 2021-05-10 ENCOUNTER — APPOINTMENT (OUTPATIENT)
Dept: GASTROENTEROLOGY | Facility: HOSPITAL | Age: 31
End: 2021-05-10

## 2021-05-10 ENCOUNTER — OUTPATIENT (OUTPATIENT)
Dept: OUTPATIENT SERVICES | Facility: HOSPITAL | Age: 31
LOS: 1 days | Discharge: HOME | End: 2021-05-10
Payer: COMMERCIAL

## 2021-05-10 ENCOUNTER — RESULT REVIEW (OUTPATIENT)
Age: 31
End: 2021-05-10

## 2021-05-10 LAB — SARS-COV-2 N GENE NPH QL NAA+PROBE: NOT DETECTED

## 2021-05-10 PROCEDURE — 88341 IMHCHEM/IMCYTCHM EA ADD ANTB: CPT | Mod: 26

## 2021-05-10 PROCEDURE — 88342 IMHCHEM/IMCYTCHM 1ST ANTB: CPT | Mod: 26

## 2021-05-10 PROCEDURE — 45380 COLONOSCOPY AND BIOPSY: CPT

## 2021-05-10 PROCEDURE — 43239 EGD BIOPSY SINGLE/MULTIPLE: CPT

## 2021-05-10 PROCEDURE — 88305 TISSUE EXAM BY PATHOLOGIST: CPT | Mod: 26

## 2021-05-11 LAB — GRAM STN FLD: SIGNIFICANT CHANGE UP

## 2021-05-12 LAB
CULTURE RESULTS: SIGNIFICANT CHANGE UP
SPECIMEN SOURCE: SIGNIFICANT CHANGE UP
SURGICAL PATHOLOGY STUDY: SIGNIFICANT CHANGE UP

## 2021-05-13 LAB
B-GALACTOSIDASE TISS-CCNT: 175.6 U/G — SIGNIFICANT CHANGE UP
DISACCHARIDASES TSMI-IMP: SIGNIFICANT CHANGE UP
ISOMALTASE TISS-CCNT: 16 U/G — SIGNIFICANT CHANGE UP
PALATINASE TISS-CCNT: 47.9 U/G — SIGNIFICANT CHANGE UP
SUCRASE TISS-CCNT: 6.4 U/G — LOW

## 2021-06-01 ENCOUNTER — NON-APPOINTMENT (OUTPATIENT)
Age: 31
End: 2021-06-01

## 2021-06-03 ENCOUNTER — NON-APPOINTMENT (OUTPATIENT)
Age: 31
End: 2021-06-03

## 2021-06-03 DIAGNOSIS — K52.9 NONINFECTIVE GASTROENTERITIS AND COLITIS, UNSPECIFIED: ICD-10-CM

## 2021-06-09 LAB
CULTURE RESULTS: SIGNIFICANT CHANGE UP
SPECIMEN SOURCE: SIGNIFICANT CHANGE UP

## 2021-07-31 ENCOUNTER — RX RENEWAL (OUTPATIENT)
Age: 31
End: 2021-07-31

## 2021-08-13 ENCOUNTER — TRANSCRIPTION ENCOUNTER (OUTPATIENT)
Age: 31
End: 2021-08-13

## 2021-08-16 ENCOUNTER — TRANSCRIPTION ENCOUNTER (OUTPATIENT)
Age: 31
End: 2021-08-16

## 2021-08-18 ENCOUNTER — TRANSCRIPTION ENCOUNTER (OUTPATIENT)
Age: 31
End: 2021-08-18

## 2021-08-20 ENCOUNTER — TRANSCRIPTION ENCOUNTER (OUTPATIENT)
Age: 31
End: 2021-08-20

## 2021-08-24 ENCOUNTER — TRANSCRIPTION ENCOUNTER (OUTPATIENT)
Age: 31
End: 2021-08-24

## 2021-09-01 ENCOUNTER — TRANSCRIPTION ENCOUNTER (OUTPATIENT)
Age: 31
End: 2021-09-01

## 2021-09-01 RX ORDER — PRUCALOPRIDE 2 MG/1
2 TABLET, FILM COATED ORAL
Qty: 30 | Refills: 2 | Status: ACTIVE | COMMUNITY
Start: 2020-06-10 | End: 1900-01-01

## 2021-09-03 ENCOUNTER — TRANSCRIPTION ENCOUNTER (OUTPATIENT)
Age: 31
End: 2021-09-03

## 2021-09-07 ENCOUNTER — TRANSCRIPTION ENCOUNTER (OUTPATIENT)
Age: 31
End: 2021-09-07

## 2022-01-07 ENCOUNTER — APPOINTMENT (OUTPATIENT)
Dept: GASTROENTEROLOGY | Facility: CLINIC | Age: 32
End: 2022-01-07
Payer: COMMERCIAL

## 2022-01-07 PROCEDURE — 99215 OFFICE O/P EST HI 40 MIN: CPT | Mod: 95

## 2022-01-07 RX ORDER — PRUCALOPRIDE 2 MG/1
2 TABLET, FILM COATED ORAL
Qty: 30 | Refills: 2 | Status: ACTIVE | COMMUNITY
Start: 2022-01-07 | End: 1900-01-01

## 2022-01-10 NOTE — HISTORY OF PRESENT ILLNESS
[de-identified] : 31 F with PMHx interstitial cystitis, celiac disease, POTS and endometriosis (last surgery 2017; last MRI 2019 on ovaries/fallopian tubes) , here for a follow up visit.\par \par 1/7/21\par - feels worse, similar to previous SIBO symptoms which had resolved with XIFAXAN\par taking bisacodyl and some miralax- although it gives her heart palpations when she takes it every day (dehydration?) \par trulance- too strong for regular use but does the trick when needs to go \par gluten free but not paleo currently \par getting EDS eval\par EGD 5/2021= Mild gastritis, hill grade 2; PATH NORMAL. LACTASE DEFICIENT. SIBO/SIFO negative \par CSCOPE 5/2021= stool seen in the TI suggestive of fecalization of the SI, tortuous colon. TI NORMAL; R AND LEFT COLON WITH MILD INACTIVE CHRONIC INFLAMMATION\par MRE= normal\par \par Reports she still feels very constipated, has been taking combination of motegrity, colace and bisacodyl, takes motegrity 4-5 times a week, but feels very backed up, bloated and intermittent pain abdomen. BM very 1-2 day, hard, no urge to go . Denies n/v/fever. No blood in stool.\par Feels her SIBO is back , also feels very restircted in terms of her diet as can only tolerate very few food items now\par \par Previous history: \par Patient states that since July she started experiencing constipation and bloating was unable to go to bathroom even with simultaneous use of MiraLAX, Colace , eventually added bisacodyl that helped her go. Had Called our office and plan was to start rifaximin and falgyl for 2 weeks. Patient states she got the medication but was already starting to feel better, took dose for a day felt very nauseous so stopped taking the meds, and kept felling better and now feels very good. Having bm soft 1-2 times a day (occasionally small calibre). No blood in stool. Still has bloating all day with some abdominal discomfort.\par No n/v/fever/ blood in stool\par Has heartburn mohan when takes aspirin or NSAID and takes famotidine at that time as needed. \par No recent change in weight/appetitie \par \par \par \par \par \par Past history \par SIBO test positive for hydrogen and methane in Jan 2020. She only completed 4 days of neomycin and one week of flagyl- stopped abx due to adverse symptoms.\par \par In part of SIBO workup, patient underwent smartpill confirming gastroparesis but it malfunctioned and the small bowel and colon transit times were not assessed. In March she was started on motegrity. She has gradually increased to 1.5 mg daily. She feels like symptoms are slightly improved but not completely resolved. She still has bloating and some nausea. She is on low fodmap, and liquid diet during day and eats chicken breast at night. She generally eats 3 meals a day. Her BMs improved while on motegrity but for the past 2 weeks, her constipation has worsened. She recently had mucous cysts on her lip removed by oral surgeon, who placed her on amoxicillin and she feels like that worsened her constipation. She is also less active and has not been exercising\par \par 3/5/20\par - Took Rifaximin and Neomycin and felt horrible therefore switched to Flagyl (pooped every day on it) but bloating got severe. Finished Rifaximin but only took 5 days of Flagyl. Bloating is worse than prior to antibiotics. \par - can't even button her pants, that is how severe the bloating is \par - 2 days of diarrhea after Rifaximin (water coming out)\par - now BMs better - taking Colace before bed \par - still sometimes having discomfort in left upper or left lower parts of stomach \par - can't eat apples now for some reason, feels them stuck in her stomach \par - eating a lot less than usual, diet consisting of eggs for breakfast, butternut squash soup, chicken and green beans \par - does mostly LOW FODMAP diet \par - biking once a week and strength training \par - did not start Motegrity yet - will start tonight \par \par 2/7/20\par - Smartpill malfunction\par - MiraLAX helps to get rid of constipation but causes imbalance of electrolytes. no more gluten free bread. had BM Monday and Friday - since December- soft stools almost diarrhea \par - Paleo diet now \par - Nexium and Pepcid, wants Zantac back because Pepcid gives her diarrhea \par - heartburn was bad when off for SmartPill \par - cystitis causes a lot of issues - urobel \par \par Previous history:\par Patient reports that she was initially diagnosed with SIBO in December 2018. She took Rifaximin (felt better) then did Rifaximin again without SIBO test.\par A year and a half ago was when symptoms got severe but has always had stomach issues. \par Her main symptoms now are constipation and bloating. She has suffered with constipation for a long time - currently takes MiraLAX nightly for half the month each month. Baseline nausea. Gets the stomach flu every year. She has a family history of celiac disease. She reports that she had celiac genetic test which was + therefore she will never eat gluten again. Refused to do gluten load for proper diagnosis through endoscopy. \par \par 9/19 labs\par sucrose- borderline \par CMP- normal, ALT 29\par hemoglobin 13.1 \par albumin- 5\par bilirubin- 1.7 \par high cholesterol \par iron saturation- 54% , was eating a lot of red meat \par TSH- normal \par ferritin- 89 \par copper low since on IC diet \par \par early history:\par vaginal delivery \par  - 2 years \par prone to UTIs - been on antibiotics \par laparoscopic surgery for endometriosis \par ulcer in the past- took Nexium. \par \par

## 2022-01-10 NOTE — REASON FOR VISIT
[Home] : at home, [unfilled] , at the time of the visit. [Medical Office: ([facility])___] : at the medical office located in  [Verbal consent obtained from patient] : the patient, [unfilled] [Follow-Up: _____] : a [unfilled] follow-up visit

## 2022-01-10 NOTE — ASSESSMENT
[FreeTextEntry1] : 31 F with PMHx interstitial cystitis, ?celiac disease, POTS and endometriosis (last surgery 2017; last MRI 2019 on ovaries/fallopian tubes) , IMO, GASTROPARESIS and likely other intestinal dysmotility, presents for follow up \par - XIFAXAN course that has helped in the past\par - motegrity afterward, hopefully Rx vacation helps with efficacy\par - can try mg ox instead of miralax considering palpitations\par - avoid LACTOSE\par - f/u EDS assessment\par - f/u after above\par

## 2022-06-10 ENCOUNTER — APPOINTMENT (OUTPATIENT)
Dept: GASTROENTEROLOGY | Facility: CLINIC | Age: 32
End: 2022-06-10
Payer: COMMERCIAL

## 2022-06-10 ENCOUNTER — TRANSCRIPTION ENCOUNTER (OUTPATIENT)
Age: 32
End: 2022-06-10

## 2022-06-10 PROCEDURE — 99215 OFFICE O/P EST HI 40 MIN: CPT | Mod: 95

## 2022-06-12 NOTE — HISTORY OF PRESENT ILLNESS
[Medical Office: ([facility])___] : at the medical office located in  [Home] : at home, [unfilled] , at the time of the visit. 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 LACTASE DEFICIENT. SIBO/SIFO negative \par CSCOPE 5/2021= stool seen in the TI suggestive of fecalization of the SI, tortuous colon. TI NORMAL; R AND LEFT COLON WITH MILD INACTIVE CHRONIC INFLAMMATION\par MRE= normal\par \par Reports she still feels very constipated, has been taking combination of motegrity, colace and bisacodyl, takes motegrity 4-5 times a week, but feels very backed up, bloated and intermittent pain abdomen. BM very 1-2 day, hard, no urge to go . Denies n/v/fever. No blood in stool.\par Feels her SIBO is back , also feels very restircted in terms of her diet as can only tolerate very few food items now\par \par Previous history: \par Patient states that since July she started experiencing constipation and bloating was unable to go to bathroom even with simultaneous use of MiraLAX, Colace , eventually added bisacodyl that helped her go. Had Called our office and plan was to start rifaximin and falgyl for 2 weeks. Patient states she got the medication but was already starting to feel better, took dose for a day felt very nauseous so stopped taking the meds, and kept felling better and now feels very good. Having bm soft 1-2 times a day (occasionally small calibre). No blood in stool. Still has bloating all day with some abdominal discomfort.\par No n/v/fever/ blood in stool\par Has heartburn mohan when takes aspirin or NSAID and takes famotidine at that time as needed. \par No recent change in weight/appetitie \par \par \par \par \par \par Past history \par SIBO test positive for hydrogen and methane in Jan 2020. She only completed 4 days of neomycin and one week of flagyl- stopped abx due to adverse symptoms.\par \par In part of SIBO workup, patient underwent smartpill confirming gastroparesis but it malfunctioned and the small bowel and colon transit times were not assessed. In March she was started on motegrity. She has gradually increased to 1.5 mg daily. She feels like symptoms are slightly improved but not completely resolved. She still has bloating and some nausea. She is on low fodmap, and liquid diet during day and eats chicken breast at night. She generally eats 3 meals a day. Her BMs improved while on motegrity but for the past 2 weeks, her constipation has worsened. She recently had mucous cysts on her lip removed by oral surgeon, who placed her on amoxicillin and she feels like that worsened her constipation. She is also less active and has not been exercising\par \par 3/5/20\par - Took Rifaximin and Neomycin and felt horrible therefore switched to Flagyl (pooped every day on it) but bloating got severe. Finished Rifaximin but only took 5 days of Flagyl. Bloating is worse than prior to antibiotics. \par - can't even button her pants, that is how severe the bloating is \par - 2 days of diarrhea after Rifaximin (water coming out)\par - now BMs better - taking Colace before bed \par - still sometimes having discomfort in left upper or left lower parts of stomach \par - can't eat apples now for some reason, feels them stuck in her stomach \par - eating a lot less than usual, diet consisting of eggs for breakfast, butternut squash soup, chicken and green beans \par - does mostly LOW FODMAP diet \par - biking once a week and strength training \par - did not start Motegrity yet - will start tonight \par \par 2/7/20\par - Smartpill malfunction\par - MiraLAX helps to get rid of constipation but causes imbalance of electrolytes. no more gluten free bread. had BM Monday and Friday - since December- soft stools almost diarrhea \par - Paleo diet now \par - Nexium and Pepcid, wants Zantac back because Pepcid gives her diarrhea \par - heartburn was bad when off for SmartPill \par - cystitis causes a lot of issues - urobel \par \par Previous history:\par Patient reports that she was initially diagnosed with SIBO in December 2018. She took Rifaximin (felt better) then did Rifaximin again without SIBO test.\par A year and a half ago was when symptoms got severe but has always had stomach issues. \par Her main symptoms now are constipation and bloating. She has suffered with constipation for a long time - currently takes MiraLAX nightly for half the month each month. Baseline nausea. Gets the stomach flu every year. She has a family history of celiac disease. She reports that she had celiac genetic test which was + therefore she will never eat gluten again. Refused to do gluten load for proper diagnosis through endoscopy. \par \par 9/19 labs\par sucrose- borderline \par CMP- normal, ALT 29\par hemoglobin 13.1 \par albumin- 5\par bilirubin- 1.7 \par high cholesterol \par iron saturation- 54% , was eating a lot of red meat \par TSH- normal \par ferritin- 89 \par copper low since on IC diet \par \par early history:\par vaginal delivery \par  - 2 years \par prone to UTIs - been on antibiotics \par laparoscopic surgery for endometriosis \par ulcer in the past- took Nexium. \par \par

## 2022-06-12 NOTE — ASSESSMENT
[FreeTextEntry1] : 31 F with PMHx interstitial cystitis, ?celiac disease, POTS and endometriosis (last surgery 2017; last MRI 2019 on ovaries/fallopian tubes) , IMO, GASTROPARESIS and likely other intestinal dysmotility, presents for follow up with recurrent IMO symptoms\par - Xifaxan, flagyl\par - motegrity afterward\par - mg ox instead of miralax considering palpitations\par - avoid LACTOSE\par - f/u after above\par

## 2022-07-11 ENCOUNTER — TRANSCRIPTION ENCOUNTER (OUTPATIENT)
Age: 32
End: 2022-07-11

## 2023-01-19 ENCOUNTER — APPOINTMENT (OUTPATIENT)
Dept: GASTROENTEROLOGY | Facility: CLINIC | Age: 33
End: 2023-01-19
Payer: COMMERCIAL

## 2023-01-19 ENCOUNTER — LABORATORY RESULT (OUTPATIENT)
Age: 33
End: 2023-01-19

## 2023-01-19 VITALS
BODY MASS INDEX: 21.34 KG/M2 | SYSTOLIC BLOOD PRESSURE: 110 MMHG | DIASTOLIC BLOOD PRESSURE: 80 MMHG | HEIGHT: 64 IN | TEMPERATURE: 97.5 F | OXYGEN SATURATION: 98 % | RESPIRATION RATE: 16 BRPM | HEART RATE: 98 BPM | WEIGHT: 125 LBS

## 2023-01-19 DIAGNOSIS — K59.04 CHRONIC IDIOPATHIC CONSTIPATION: ICD-10-CM

## 2023-01-19 DIAGNOSIS — K58.1 IRRITABLE BOWEL SYNDROME WITH CONSTIPATION: ICD-10-CM

## 2023-01-19 PROCEDURE — 99215 OFFICE O/P EST HI 40 MIN: CPT

## 2023-01-19 RX ORDER — BUDESONIDE 3 MG/1
3 CAPSULE, COATED PELLETS ORAL
Qty: 90 | Refills: 0 | Status: DISCONTINUED | COMMUNITY
Start: 2021-06-03 | End: 2023-01-19

## 2023-01-19 RX ORDER — TENAPANOR HYDROCHLORIDE 53.2 MG/1
50 TABLET ORAL
Qty: 60 | Refills: 3 | Status: ACTIVE | COMMUNITY
Start: 2023-01-19 | End: 1900-01-01

## 2023-01-19 RX ORDER — RIFAXIMIN 550 MG/1
550 TABLET ORAL
Qty: 42 | Refills: 0 | Status: COMPLETED | COMMUNITY
Start: 2022-01-07 | End: 2023-01-19

## 2023-01-19 RX ORDER — PRUCALOPRIDE 1 MG/1
1 TABLET, FILM COATED ORAL
Qty: 90 | Refills: 2 | Status: ACTIVE | COMMUNITY
Start: 2020-02-07 | End: 1900-01-01

## 2023-01-19 RX ORDER — RIFAXIMIN 550 MG/1
550 TABLET ORAL
Qty: 42 | Refills: 0 | Status: COMPLETED | COMMUNITY
Start: 2020-02-07 | End: 2023-01-19

## 2023-01-19 RX ORDER — METRONIDAZOLE 250 MG/1
250 TABLET ORAL
Qty: 42 | Refills: 0 | Status: COMPLETED | COMMUNITY
Start: 2020-02-12 | End: 2023-01-19

## 2023-01-20 NOTE — PHYSICAL EXAM
[Bowel Sounds] : normal bowel sounds [Abdomen Tenderness] : non-tender [Normal Color / Pigmentation] : normal skin color and pigmentation [No Focal Deficits] : no focal deficits [Normal] : oriented to person, place, and time [Oriented To Time, Place, And Person] : oriented to person, place, and time [Normal Affect] : the affect was normal [Normal Mood] : the mood was normal

## 2023-01-22 LAB
25(OH)D3 SERPL-MCNC: 66.6 NG/ML
ALBUMIN SERPL ELPH-MCNC: 4.8 G/DL
ALP BLD-CCNC: 58 U/L
ALT SERPL-CCNC: 24 U/L
ANION GAP SERPL CALC-SCNC: 11 MMOL/L
AST SERPL-CCNC: 18 U/L
BASOPHILS # BLD AUTO: 0.05 K/UL
BASOPHILS NFR BLD AUTO: 0.4 %
BILIRUB SERPL-MCNC: 1 MG/DL
BUN SERPL-MCNC: 12 MG/DL
CALCIUM SERPL-MCNC: 9.7 MG/DL
CHLORIDE SERPL-SCNC: 104 MMOL/L
CO2 SERPL-SCNC: 24 MMOL/L
CREAT SERPL-MCNC: 0.82 MG/DL
EGFR: 97 ML/MIN/1.73M2
EOSINOPHIL # BLD AUTO: 0.28 K/UL
EOSINOPHIL NFR BLD AUTO: 2.5 %
FERRITIN SERPL-MCNC: 39 NG/ML
FOLATE SERPL-MCNC: 5.7 NG/ML
GLUCOSE SERPL-MCNC: 90 MG/DL
HCT VFR BLD CALC: 39.9 %
HGB BLD-MCNC: 13.5 G/DL
IMM GRANULOCYTES NFR BLD AUTO: 0.3 %
IRON SATN MFR SERPL: 23 %
IRON SERPL-MCNC: 74 UG/DL
LYMPHOCYTES # BLD AUTO: 1.86 K/UL
LYMPHOCYTES NFR BLD AUTO: 16.6 %
MAN DIFF?: NORMAL
MCHC RBC-ENTMCNC: 30.1 PG
MCHC RBC-ENTMCNC: 33.8 GM/DL
MCV RBC AUTO: 89.1 FL
MONOCYTES # BLD AUTO: 1.11 K/UL
MONOCYTES NFR BLD AUTO: 9.9 %
NEUTROPHILS # BLD AUTO: 7.89 K/UL
NEUTROPHILS NFR BLD AUTO: 70.3 %
PLATELET # BLD AUTO: 349 K/UL
POTASSIUM SERPL-SCNC: 4.3 MMOL/L
PROT SERPL-MCNC: 7 G/DL
RBC # BLD: 4.48 M/UL
RBC # FLD: 13.7 %
SODIUM SERPL-SCNC: 140 MMOL/L
TIBC SERPL-MCNC: 321 UG/DL
UIBC SERPL-MCNC: 246 UG/DL
VIT B12 SERPL-MCNC: 590 PG/ML
WBC # FLD AUTO: 11.22 K/UL

## 2023-01-22 NOTE — HISTORY OF PRESENT ILLNESS
[Home] : at home, [unfilled] , at the time of the visit. [Medical Office: ([facility])___] : at the medical office located in  [Verbal consent obtained from patient] : the patient, [unfilled] [de-identified] : 32 F with PMHx interstitial cystitis, celiac disease, POTS and endometriosis (last surgery 2017; last MRI 2019 on ovaries/fallopian tubes) , here for a follow up visit.\par \par 1/19/22\par - at night taking bladder meds and dulcolax (2 pills at night) and these together gives her diarrhea \par - bloated all the time, BMs relieve bloating \par - miralax gives heart palpitations \par - trulance as needed \par - motegrity stops working so quickly, likes when it works\par - wants to be tested for SIBO \par - gets nausea \par \par 6.10.22\par currently on phenergan, myrbetriq, xanax, \par xifaxan and motegrity made the bloating 75%went down and constipation lessened, lasted until mid May\par then bloating constipation - took 2 dulcolax but struggling\par 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 LACTASE DEFICIENT. SIBO/SIFO negative \par CSCOPE 5/2021= stool seen in the TI suggestive of fecalization of the SI, tortuous colon. TI NORMAL; R AND LEFT COLON WITH MILD INACTIVE CHRONIC INFLAMMATION\par MRE= normal\par \par Reports she still feels very constipated, has been taking combination of motegrity, colace and bisacodyl, takes motegrity 4-5 times a week, but feels very backed up, bloated and intermittent pain abdomen. BM very 1-2 day, hard, no urge to go . Denies n/v/fever. No blood in stool.\par Feels her SIBO is back , also feels very restircted in terms of her diet as can only tolerate very few food items now\par \par Previous history: \par Patient states that since July she started experiencing constipation and bloating was unable to go to bathroom even with simultaneous use of MiraLAX, Colace , eventually added bisacodyl that helped her go. Had Called our office and plan was to start rifaximin and falgyl for 2 weeks. Patient states she got the medication but was already starting to feel better, took dose for a day felt very nauseous so stopped taking the meds, and kept felling better and now feels very good. Having bm soft 1-2 times a day (occasionally small calibre). No blood in stool. Still has bloating all day with some abdominal discomfort.\par No n/v/fever/ blood in stool\par Has heartburn mohan when takes aspirin or NSAID and takes famotidine at that time as needed. \par No recent change in weight/appetitie \par \par \par \par \par \par Past history \par SIBO test positive for hydrogen and methane in Jan 2020. She only completed 4 days of neomycin and one week of flagyl- stopped abx due to adverse symptoms.\par \par In part of SIBO workup, patient underwent smartpill confirming gastroparesis but it malfunctioned and the small bowel and colon transit times were not assessed. In March she was started on motegrity. She has gradually increased to 1.5 mg daily. She feels like symptoms are slightly improved but not completely resolved. She still has bloating and some nausea. She is on low fodmap, and liquid diet during day and eats chicken breast at night. She generally eats 3 meals a day. Her BMs improved while on motegrity but for the past 2 weeks, her constipation has worsened. She recently had mucous cysts on her lip removed by oral surgeon, who placed her on amoxicillin and she feels like that worsened her constipation. She is also less active and has not been exercising\par \par 3/5/20\par - Took Rifaximin and Neomycin and felt horrible therefore switched to Flagyl (pooped every day on it) but bloating got severe. Finished Rifaximin but only took 5 days of Flagyl. Bloating is worse than prior to antibiotics. \par - can't even button her pants, that is how severe the bloating is \par - 2 days of diarrhea after Rifaximin (water coming out)\par - now BMs better - taking Colace before bed \par - still sometimes having discomfort in left upper or left lower parts of stomach \par - can't eat apples now for some reason, feels them stuck in her stomach \par - eating a lot less than usual, diet consisting of eggs for breakfast, butternut squash soup, chicken and green beans \par - does mostly LOW FODMAP diet \par - biking once a week and strength training \par - did not start Motegrity yet - will start tonight \par \par 2/7/20\par - Smartpill malfunction\par - MiraLAX helps to get rid of constipation but causes imbalance of electrolytes. no more gluten free bread. had BM Monday and Friday - since December- soft stools almost diarrhea \par - Paleo diet now \par - Nexium and Pepcid, wants Zantac back because Pepcid gives her diarrhea \par - heartburn was bad when off for SmartPill \par - cystitis causes a lot of issues - urobel \par \par Previous history:\par Patient reports that she was initially diagnosed with SIBO in December 2018. She took Rifaximin (felt better) then did Rifaximin again without SIBO test.\par A year and a half ago was when symptoms got severe but has always had stomach issues. \par Her main symptoms now are constipation and bloating. She has suffered with constipation for a long time - currently takes MiraLAX nightly for half the month each month. Baseline nausea. Gets the stomach flu every year. She has a family history of celiac disease. She reports that she had celiac genetic test which was + therefore she will never eat gluten again. Refused to do gluten load for proper diagnosis through endoscopy. \par \par 9/19 labs\par sucrose- borderline \par CMP- normal, ALT 29\par hemoglobin 13.1 \par albumin- 5\par bilirubin- 1.7 \par high cholesterol \par iron saturation- 54% , was eating a lot of red meat \par TSH- normal \par ferritin- 89 \par copper low since on IC diet \par \par early history:\par vaginal delivery \par  - 2 years \par prone to UTIs - been on antibiotics \par laparoscopic surgery for endometriosis \par ulcer in the past- took Nexium. \par \par

## 2023-01-22 NOTE — ASSESSMENT
[FreeTextEntry1] : 32 F with PMHx interstitial cystitis, ?celiac disease, POTS and endometriosis (last surgery 2017; last MRI 2019 on ovaries/fallopian tubes) , IMO, GASTROPARESIS and likely other intestinal dysmotility, presents for follow up with recurrent IMO symptoms\par - repeat SIBO test \par - trial of IBSRELA\par - nutritional labs \par - NO miralax as causes palpitations \par - avoid LACTOSE\par - f/u after above\par

## 2023-01-22 NOTE — END OF VISIT
[FreeTextEntry3] : seen and discussed with Kerry Benz NP\par  [Time Spent: ___ minutes] : I have spent [unfilled] minutes of time on the encounter.

## 2023-01-24 ENCOUNTER — TRANSCRIPTION ENCOUNTER (OUTPATIENT)
Age: 33
End: 2023-01-24

## 2023-01-25 ENCOUNTER — TRANSCRIPTION ENCOUNTER (OUTPATIENT)
Age: 33
End: 2023-01-25

## 2023-02-05 ENCOUNTER — TRANSCRIPTION ENCOUNTER (OUTPATIENT)
Age: 33
End: 2023-02-05

## 2023-02-16 ENCOUNTER — TRANSCRIPTION ENCOUNTER (OUTPATIENT)
Age: 33
End: 2023-02-16

## 2023-03-17 ENCOUNTER — TRANSCRIPTION ENCOUNTER (OUTPATIENT)
Age: 33
End: 2023-03-17

## 2023-03-24 ENCOUNTER — TRANSCRIPTION ENCOUNTER (OUTPATIENT)
Age: 33
End: 2023-03-24

## 2023-03-27 ENCOUNTER — TRANSCRIPTION ENCOUNTER (OUTPATIENT)
Age: 33
End: 2023-03-27

## 2023-04-06 ENCOUNTER — APPOINTMENT (OUTPATIENT)
Dept: GASTROENTEROLOGY | Facility: CLINIC | Age: 33
End: 2023-04-06
Payer: COMMERCIAL

## 2023-04-06 DIAGNOSIS — B99.9 UNSPECIFIED INFECTIOUS DISEASE: ICD-10-CM

## 2023-04-06 PROCEDURE — 99215 OFFICE O/P EST HI 40 MIN: CPT | Mod: 95

## 2023-04-06 RX ORDER — METRONIDAZOLE 250 MG/1
250 TABLET ORAL
Qty: 42 | Refills: 3 | Status: ACTIVE | COMMUNITY
Start: 2023-04-06 | End: 1900-01-01

## 2023-04-06 RX ORDER — RIFAXIMIN 550 MG/1
550 TABLET ORAL
Qty: 42 | Refills: 3 | Status: ACTIVE | COMMUNITY
Start: 2023-04-06 | End: 1900-01-01

## 2023-04-06 NOTE — HISTORY OF PRESENT ILLNESS
[de-identified] : 32 F with PMHx interstitial cystitis, celiac disease, POTS and endometriosis (last surgery 2017; last MRI 2019 on ovaries/fallopian tubes) , here for a follow up visit.\par 4/6/23\par taking dulcolax\par has ibsrela but hasn’t started it\par wondering about vibrant\par TRIO SMART POSITIVE FOR CH4\par \par 1/19/22\par - at night taking bladder meds and dulcolax (2 pills at night) and these together gives her diarrhea \par - bloated all the time, BMs relieve bloating \par - miralax gives heart palpitations \par - trulance as needed \par - motegrity stops working so quickly, likes when it works\par - wants to be tested for SIBO \par - gets nausea \par \par 6.10.22\par currently on phenergan, myrbetriq, xanax, \par xifaxan and motegrity made the bloating 75%went down and constipation lessened, lasted until mid May\par then bloating constipation - took 2 dulcolax but struggling\par 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 LACTASE DEFICIENT. SIBO/SIFO negative \par CSCOPE 5/2021= stool seen in the TI suggestive of fecalization of the SI, tortuous colon. TI NORMAL; R AND LEFT COLON WITH MILD INACTIVE CHRONIC INFLAMMATION\par MRE= normal\par \par Reports she still feels very constipated, has been taking combination of motegrity, colace and bisacodyl, takes motegrity 4-5 times a week, but feels very backed up, bloated and intermittent pain abdomen. BM very 1-2 day, hard, no urge to go . Denies n/v/fever. No blood in stool.\par Feels her SIBO is back , also feels very restircted in terms of her diet as can only tolerate very few food items now\par \par Previous history: \par Patient states that since July she started experiencing constipation and bloating was unable to go to bathroom even with simultaneous use of MiraLAX, Colace , eventually added bisacodyl that helped her go. Had Called our office and plan was to start rifaximin and falgyl for 2 weeks. Patient states she got the medication but was already starting to feel better, took dose for a day felt very nauseous so stopped taking the meds, and kept felling better and now feels very good. Having bm soft 1-2 times a day (occasionally small calibre). No blood in stool. Still has bloating all day with some abdominal discomfort.\par No n/v/fever/ blood in stool\par Has heartburn mohan when takes aspirin or NSAID and takes famotidine at that time as needed. \par No recent change in weight/appetitie \par \par \par \par \par \par Past history \par SIBO test positive for hydrogen and methane in Jan 2020. She only completed 4 days of neomycin and one week of flagyl- stopped abx due to adverse symptoms.\par \par In part of SIBO workup, patient underwent smartpill confirming gastroparesis but it malfunctioned and the small bowel and colon transit times were not assessed. In March she was started on motegrity. She has gradually increased to 1.5 mg daily. She feels like symptoms are slightly improved but not completely resolved. She still has bloating and some nausea. She is on low fodmap, and liquid diet during day and eats chicken breast at night. She generally eats 3 meals a day. Her BMs improved while on motegrity but for the past 2 weeks, her constipation has worsened. She recently had mucous cysts on her lip removed by oral surgeon, who placed her on amoxicillin and she feels like that worsened her constipation. She is also less active and has not been exercising\par \par 3/5/20\par - Took Rifaximin and Neomycin and felt horrible therefore switched to Flagyl (pooped every day on it) but bloating got severe. Finished Rifaximin but only took 5 days of Flagyl. Bloating is worse than prior to antibiotics. \par - can't even button her pants, that is how severe the bloating is \par - 2 days of diarrhea after Rifaximin (water coming out)\par - now BMs better - taking Colace before bed \par - still sometimes having discomfort in left upper or left lower parts of stomach \par - can't eat apples now for some reason, feels them stuck in her stomach \par - eating a lot less than usual, diet consisting of eggs for breakfast, butternut squash soup, chicken and green beans \par - does mostly LOW FODMAP diet \par - biking once a week and strength training \par - did not start Motegrity yet - will start tonight \par \par 2/7/20\par - Smartpill malfunction\par - MiraLAX helps to get rid of constipation but causes imbalance of electrolytes. no more gluten free bread. had BM Monday and Friday - since December- soft stools almost diarrhea \par - Paleo diet now \par - Nexium and Pepcid, wants Zantac back because Pepcid gives her diarrhea \par - heartburn was bad when off for SmartPill \par - cystitis causes a lot of issues - urobel \par \par Previous history:\par Patient reports that she was initially diagnosed with SIBO in December 2018. She took Rifaximin (felt better) then did Rifaximin again without SIBO test.\par A year and a half ago was when symptoms got severe but has always had stomach issues. \par Her main symptoms now are constipation and bloating. She has suffered with constipation for a long time - currently takes MiraLAX nightly for half the month each month. Baseline nausea. Gets the stomach flu every year. She has a family history of celiac disease. She reports that she had celiac genetic test which was + therefore she will never eat gluten again. Refused to do gluten load for proper diagnosis through endoscopy. \par \par 9/19 labs\par sucrose- borderline \par CMP- normal, ALT 29\par hemoglobin 13.1 \par albumin- 5\par bilirubin- 1.7 \par high cholesterol \par iron saturation- 54% , was eating a lot of red meat \par TSH- normal \par ferritin- 89 \par copper low since on IC diet \par \par early history:\par vaginal delivery \par  - 2 years \par prone to UTIs - been on antibiotics \par laparoscopic surgery for endometriosis \par ulcer in the past- took Nexium. \par \par

## 2023-04-06 NOTE — ASSESSMENT
[FreeTextEntry1] : 32 F with PMHx interstitial cystitis, ?celiac disease, POTS and endometriosis (last surgery 2017; last MRI 2019 on ovaries/fallopian tubes) , IMO, GASTROPARESIS and likely other intestinal dysmotility, presents for follow up with recurrent IMO symptoms\par - IMO protocol, r/b/a/i discussed and patient agreeable\par - trial of IBSRELA afterward\par - will avoid motegrity \par - will inquire if ok to use vibrant with hx gastroparesis, repeat gastroparesis exam pt will inquire about not using gluten\par - nutritional labs \par - NO miralax as causes palpitations \par - avoid LACTOSE\par - f/u after above\par

## 2023-05-04 ENCOUNTER — TRANSCRIPTION ENCOUNTER (OUTPATIENT)
Age: 33
End: 2023-05-04

## 2023-06-07 ENCOUNTER — APPOINTMENT (OUTPATIENT)
Dept: GASTROENTEROLOGY | Facility: CLINIC | Age: 33
End: 2023-06-07
Payer: COMMERCIAL

## 2023-06-07 PROCEDURE — 99214 OFFICE O/P EST MOD 30 MIN: CPT | Mod: 95

## 2023-06-27 NOTE — ASSESSMENT
[FreeTextEntry1] : 32 F with PMHx interstitial cystitis, ?celiac disease, POTS and endometriosis (last surgery 2017; last MRI 2019 on ovaries/fallopian tubes) , IMO, GASTROPARESIS and likely other intestinal dysmotility, presents for follow up with recurrent IMO symptoms\par - will inquire if ok to use vibrant with hx gastroparesis, repeat gastroparesis exam pt will inquire about not using gluten\par - s/p IMO protocol, \par - will avoid motegrity n\par - monitor nutritional labs \par - NO miralax as causes palpitations \par - avoid LACTOSE\par - f/u after above, pt understands i am leaving practice in june therefore to f/u with me in new practice or with aleksander colleague and to get f/u appt asap so gets seen in a timely fashion, pt understands so does not get lost to follow up\par \par  Statement Selected

## 2023-11-06 ENCOUNTER — APPOINTMENT (OUTPATIENT)
Dept: GASTROENTEROLOGY | Facility: CLINIC | Age: 33
End: 2023-11-06
Payer: COMMERCIAL

## 2023-11-06 VITALS
RESPIRATION RATE: 16 BRPM | HEART RATE: 111 BPM | DIASTOLIC BLOOD PRESSURE: 83 MMHG | TEMPERATURE: 98.1 F | BODY MASS INDEX: 22.09 KG/M2 | OXYGEN SATURATION: 98 % | HEIGHT: 64 IN | WEIGHT: 129.4 LBS | SYSTOLIC BLOOD PRESSURE: 118 MMHG

## 2023-11-06 DIAGNOSIS — K59.09 OTHER CONSTIPATION: ICD-10-CM

## 2023-11-06 PROCEDURE — 99213 OFFICE O/P EST LOW 20 MIN: CPT
